# Patient Record
Sex: FEMALE | Race: WHITE | NOT HISPANIC OR LATINO
[De-identification: names, ages, dates, MRNs, and addresses within clinical notes are randomized per-mention and may not be internally consistent; named-entity substitution may affect disease eponyms.]

---

## 2017-01-20 ENCOUNTER — RECORD ABSTRACTING (OUTPATIENT)
Age: 47
End: 2017-01-20

## 2017-01-20 DIAGNOSIS — Z87.828 PERSONAL HISTORY OF OTHER (HEALED) PHYSICAL INJURY AND TRAUMA: ICD-10-CM

## 2017-01-20 DIAGNOSIS — Z78.9 OTHER SPECIFIED HEALTH STATUS: ICD-10-CM

## 2017-01-20 DIAGNOSIS — Z86.79 PERSONAL HISTORY OF OTHER DISEASES OF THE CIRCULATORY SYSTEM: ICD-10-CM

## 2017-01-20 RX ORDER — MULTIVITAMIN
TABLET ORAL
Refills: 0 | Status: ACTIVE | COMMUNITY

## 2017-01-20 RX ORDER — PHENOL 1.4 %
AEROSOL, SPRAY (ML) MUCOUS MEMBRANE
Refills: 0 | Status: ACTIVE | COMMUNITY

## 2017-02-14 ENCOUNTER — APPOINTMENT (OUTPATIENT)
Dept: DERMATOLOGY | Facility: CLINIC | Age: 47
End: 2017-02-14

## 2017-02-14 DIAGNOSIS — L70.9 ACNE, UNSPECIFIED: ICD-10-CM

## 2017-02-14 RX ORDER — TALC AND ZINC OXIDE 57.51; 10.65 G/71G; G/71G
81-15 POWDER TOPICAL
Qty: 1 | Refills: 6 | Status: ACTIVE | COMMUNITY
Start: 1900-01-01 | End: 1900-01-01

## 2017-05-30 ENCOUNTER — TRANSCRIPTION ENCOUNTER (OUTPATIENT)
Age: 47
End: 2017-05-30

## 2017-08-12 ENCOUNTER — EMERGENCY (EMERGENCY)
Facility: HOSPITAL | Age: 47
LOS: 0 days | Discharge: HOME | End: 2017-08-12
Attending: FAMILY MEDICINE | Admitting: FAMILY MEDICINE
Payer: MEDICAID

## 2017-08-12 VITALS
DIASTOLIC BLOOD PRESSURE: 65 MMHG | OXYGEN SATURATION: 100 % | HEART RATE: 72 BPM | SYSTOLIC BLOOD PRESSURE: 112 MMHG | RESPIRATION RATE: 18 BRPM

## 2017-08-12 VITALS
HEART RATE: 65 BPM | HEIGHT: 60 IN | OXYGEN SATURATION: 98 % | WEIGHT: 160.06 LBS | TEMPERATURE: 98 F | SYSTOLIC BLOOD PRESSURE: 119 MMHG | RESPIRATION RATE: 16 BRPM | DIASTOLIC BLOOD PRESSURE: 80 MMHG

## 2017-08-12 DIAGNOSIS — Q90.9 DOWN SYNDROME, UNSPECIFIED: ICD-10-CM

## 2017-08-12 DIAGNOSIS — Z95.1 PRESENCE OF AORTOCORONARY BYPASS GRAFT: Chronic | ICD-10-CM

## 2017-08-12 DIAGNOSIS — M25.462 EFFUSION, LEFT KNEE: ICD-10-CM

## 2017-08-12 DIAGNOSIS — Z90.49 ACQUIRED ABSENCE OF OTHER SPECIFIED PARTS OF DIGESTIVE TRACT: Chronic | ICD-10-CM

## 2017-08-12 PROCEDURE — 93971 EXTREMITY STUDY: CPT | Mod: 26,LT

## 2017-08-12 PROCEDURE — 99284 EMERGENCY DEPT VISIT MOD MDM: CPT | Mod: 25

## 2017-08-12 PROCEDURE — 73562 X-RAY EXAM OF KNEE 3: CPT | Mod: 26,LT

## 2017-08-12 PROCEDURE — 76377 3D RENDER W/INTRP POSTPROCES: CPT | Mod: 26

## 2017-08-12 PROCEDURE — 73700 CT LOWER EXTREMITY W/O DYE: CPT | Mod: 26,LT

## 2017-08-12 RX ORDER — IBUPROFEN 200 MG
600 TABLET ORAL ONCE
Qty: 0 | Refills: 0 | Status: COMPLETED | OUTPATIENT
Start: 2017-08-12 | End: 2017-08-12

## 2017-08-12 RX ADMIN — Medication 600 MILLIGRAM(S): at 06:08

## 2017-08-12 NOTE — CONSULT NOTE ADULT - SUBJECTIVE AND OBJECTIVE BOX
47y Female community ambulatory presents c/o L knee pain for the last onset last night with no known trauma. Patient is a able to answer questions, history somewhat unreliable, with aid at bedside. Denies numbness/tingling. Denies fever/chills. Denies pain/injury elsewhere.     PAST MEDICAL & SURGICAL HISTORY:  Down syndrome  Pneumonia  S/P CABG (coronary artery bypass graft)  S/P cholecystectomy    MEDICATIONS  (STANDING):    Allergies    No Known Allergies    Intolerances      Vital Signs Last 24 Hrs  T(C): 37.6 (08-12-17 @ 07:11), Max: 37.6 (08-12-17 @ 07:11)  T(F): 99.6 (08-12-17 @ 07:11), Max: 99.6 (08-12-17 @ 07:11)  HR: 75 (08-12-17 @ 10:19) (65 - 75)  BP: 105/63 (08-12-17 @ 10:19) (105/63 - 119/80)  BP(mean): --  RR: 18 (08-12-17 @ 10:19) (16 - 18)  SpO2: 100% (08-12-17 @ 10:19) (98% - 100%)    Imaging: XR demonstates L Knee no acute fracture or dislocation noted    Physical Exam  Gen: NAD  Left LE: skin intact, No erythema. AROM limited to pain. Swelling noted. Positive mild Effusion. Able to SLR, Neg log roll, TTp on medial aspect of the knee, no ttp elsewhere, +EHL/FHL/TA/GS function, no calf TTP, DP/PT pulses intact, compartments soft. Calf soft, nontender. Ligamentous exam benign: Varus/Valgus/Lockman Negative. Examination of tenderness inconsistent.

## 2017-08-12 NOTE — ED ADULT NURSE REASSESSMENT NOTE - NS ED NURSE REASSESS COMMENT FT1
pt care taken over at 10:12. Pt resting in bed, no acute distress noted. Caretaker at bedside. Awaiting Ortho consult. WIll monitor closely

## 2017-08-12 NOTE — ED ADULT TRIAGE NOTE - CHIEF COMPLAINT QUOTE
left knee pain. Patient from group home. Aid denies injury. Patient has complained of this before. No visible signs of injury.

## 2017-08-12 NOTE — ED PROVIDER NOTE - MUSCULOSKELETAL, MLM
Spine appears normal, range of motion is not limited. +tenderness to left lateral knee, no deformity noted

## 2017-08-12 NOTE — ED PROVIDER NOTE - MEDICAL DECISION MAKING DETAILS
Pt with hx Down's Syndrome with acute pain in knee with no hx of injury. XRay, doppler. No signs of infection at this time. Pain meds.

## 2017-08-12 NOTE — ED ADULT NURSE NOTE - OBJECTIVE STATEMENT
Patient arrived from group home c/o left knee pain Patient arrived from group home c/o left knee pain. Previous episode about 2 months ago, given Motrin with positive relief. Patient denies hitting knee/trauma. Aid at bedside reports they have been "checking up on her every 30 minutes" since she has early stages of dementia. Patient teary at this moment. No edema, bruising noted.

## 2017-08-12 NOTE — CONSULT NOTE ADULT - ASSESSMENT
A/P: 47y Female with Left Knee Pain with no known trauma  FU CT of the knee to rule out occult fracture  Will continue to follow  Discussed with Dr. Knutson who is aware A/P: 47y Female with Left Knee Pain with no known trauma  Doppler Negative  FU CT of the knee to rule out occult fracture  Will continue to follow  Discussed with Dr. Knutson who is aware

## 2017-08-12 NOTE — ED PROVIDER NOTE - CONSTITUTIONAL, MLM
normal... Appears very uncomfortable, unable to perform full exam. Awake, alert, oriented to person, place, time/situation and in no apparent distress.

## 2017-08-12 NOTE — ED PROVIDER NOTE - OBJECTIVE STATEMENT
48 y/o female with PMHx of down syndrome, resident of a group home who awoke at 4:15am with complaint of left knee pain, no known injury. Pt has previous Hx of left leg pain for which she was evaluated at another hospital, dx unknown by group home representative. No fever. Pt is a poor historian. Hx is obtained from aide. 46 y/o female with PMHx of down syndrome, resident of a group home who awoke at 4:15am with complaint of left knee pain, no known injury. Pt has previous Hx of left leg pain for which she was evaluated at another hospital, dx unknown by group home representative. No fever. Pt is a poor historian. Hx is obtained from .

## 2017-08-12 NOTE — ED PROVIDER NOTE - PROGRESS NOTE DETAILS
spoke with orhtopedics for left knee effusion ct lower extremity negative for fracture, spoke to orthopedics d/c to home

## 2017-08-21 ENCOUNTER — OTHER (OUTPATIENT)
Age: 47
End: 2017-08-21

## 2017-08-25 ENCOUNTER — APPOINTMENT (OUTPATIENT)
Dept: ORTHOPEDIC SURGERY | Facility: CLINIC | Age: 47
End: 2017-08-25

## 2017-09-08 ENCOUNTER — APPOINTMENT (OUTPATIENT)
Dept: ORTHOPEDIC SURGERY | Facility: CLINIC | Age: 47
End: 2017-09-08

## 2018-02-13 ENCOUNTER — APPOINTMENT (OUTPATIENT)
Dept: DERMATOLOGY | Facility: CLINIC | Age: 48
End: 2018-02-13
Payer: MEDICAID

## 2018-02-13 PROCEDURE — 99213 OFFICE O/P EST LOW 20 MIN: CPT

## 2018-02-13 RX ORDER — AMOXICILLIN 875 MG/1
TABLET, FILM COATED ORAL
Refills: 0 | Status: DISCONTINUED | COMMUNITY
End: 2018-02-13

## 2018-03-05 ENCOUNTER — TRANSCRIPTION ENCOUNTER (OUTPATIENT)
Age: 48
End: 2018-03-05

## 2018-12-03 ENCOUNTER — NON-APPOINTMENT (OUTPATIENT)
Age: 48
End: 2018-12-03

## 2018-12-03 ENCOUNTER — APPOINTMENT (OUTPATIENT)
Dept: CARDIOLOGY | Facility: CLINIC | Age: 48
End: 2018-12-03
Payer: MEDICAID

## 2018-12-03 VITALS
HEIGHT: 58 IN | SYSTOLIC BLOOD PRESSURE: 90 MMHG | BODY MASS INDEX: 39.88 KG/M2 | DIASTOLIC BLOOD PRESSURE: 56 MMHG | HEART RATE: 66 BPM | WEIGHT: 190 LBS

## 2018-12-03 DIAGNOSIS — L30.4 ERYTHEMA INTERTRIGO: ICD-10-CM

## 2018-12-03 DIAGNOSIS — Z78.9 OTHER SPECIFIED HEALTH STATUS: ICD-10-CM

## 2018-12-03 PROCEDURE — 93000 ELECTROCARDIOGRAM COMPLETE: CPT

## 2018-12-03 PROCEDURE — 99244 OFF/OP CNSLTJ NEW/EST MOD 40: CPT

## 2018-12-03 RX ORDER — LORATADINE 10 MG/1
10 CAPSULE, LIQUID FILLED ORAL
Refills: 0 | Status: ACTIVE | COMMUNITY

## 2018-12-03 RX ORDER — DONEPEZIL HYDROCHLORIDE 10 MG/1
10 TABLET, FILM COATED ORAL DAILY
Refills: 0 | Status: ACTIVE | COMMUNITY

## 2018-12-03 RX ORDER — GUAIFEN/DEXTROMETHORPHAN/PPA
SYRUP ORAL
Refills: 0 | Status: ACTIVE | COMMUNITY

## 2018-12-03 RX ORDER — MIRTAZAPINE 30 MG/1
30 TABLET, FILM COATED ORAL
Refills: 0 | Status: ACTIVE | COMMUNITY

## 2018-12-03 RX ORDER — ASPIRIN ENTERIC COATED TABLETS 81 MG 81 MG/1
81 TABLET, DELAYED RELEASE ORAL
Refills: 0 | Status: ACTIVE | COMMUNITY

## 2019-01-13 NOTE — PHYSICAL EXAM
[General Appearance - Well Developed] : well developed [General Appearance - In No Acute Distress] : no acute distress [Normal Conjunctiva] : the conjunctiva exhibited no abnormalities [Normal Oral Mucosa] : normal oral mucosa [No Oral Pallor] : no oral pallor [No Oral Cyanosis] : no oral cyanosis [Heart Rate And Rhythm] : heart rate and rhythm were normal [Heart Sounds] : normal S1 and S2 [Murmurs] : no murmurs present [Edema] : no peripheral edema present [Respiration, Rhythm And Depth] : normal respiratory rhythm and effort [Exaggerated Use Of Accessory Muscles For Inspiration] : no accessory muscle use [Auscultation Breath Sounds / Voice Sounds] : lungs were clear to auscultation bilaterally [Abdomen Soft] : soft [Abdomen Tenderness] : non-tender [Nail Clubbing] : no clubbing of the fingernails [Cyanosis, Localized] : no localized cyanosis [Skin Color & Pigmentation] : normal skin color and pigmentation [Skin Turgor] : normal skin turgor [] : no rash [FreeTextEntry1] : She is awake, cooperative. Not oriented.

## 2019-01-13 NOTE — DISCUSSION/SUMMARY
[FreeTextEntry1] : - Requested records from Dr. Cooley office regarding patient's previous cardiovascular evaluation and tests.\par - She has borderline low blood pressures. She's asymptomatic. No postural symptoms or change in blood pressure. Increase oral intake of water.\par - Currently, no cardiac medications listed. Medications were not changed.- The patient has been recommended to have SBE prophylaxis. Indication? She has a history of VSD repair at young age. Subsequent echocardiogram showed septal hypokinesis, no significant cardiomegaly and acceptable pulmonary pressures at 34 mmHg and PFO\par \par I will follow this patient in future once I have Her Relevant records. Thank you for this consultation\par \par Addendum 1/13/19:\par Based on her last echo Dec 2017, there is no indication of SBE prophylaxis prior to her dental cleaning and routine dental procedure\par \par Sincerely,\par Vladislav Wilson MD, FACC, [facility] Facility[facility]INCESS

## 2019-01-13 NOTE — REASON FOR VISIT
[FreeTextEntry1] : Sendy is a 48-year-old female with Down syndrome, significant mental retardation, chronic asymptomatic borderline low blood pressures, Sleep apnea using CPAP machine, History of VSD repair, the presence of PFO on echocardiogram, chronic right bundle branch block\par \par She's currently not on any specific cardiac medications. The patient used to follow up with Dr. Cooley. She was accompanied by her aide. Her previous medical records are not currently available for review.\par \par Recent history of syncope or hospital admission. No recent history of CHF exacerbation, chest pain Or acute dyspnea

## 2019-02-13 ENCOUNTER — APPOINTMENT (OUTPATIENT)
Dept: DERMATOLOGY | Facility: CLINIC | Age: 49
End: 2019-02-13
Payer: MEDICAID

## 2019-02-13 VITALS — HEIGHT: 58 IN | WEIGHT: 190 LBS | BODY MASS INDEX: 39.88 KG/M2

## 2019-02-13 VITALS — WEIGHT: 190 LBS | BODY MASS INDEX: 39.88 KG/M2 | HEIGHT: 58 IN

## 2019-02-13 DIAGNOSIS — L21.9 SEBORRHEIC DERMATITIS, UNSPECIFIED: ICD-10-CM

## 2019-02-13 DIAGNOSIS — D22.5 MELANOCYTIC NEVI OF TRUNK: ICD-10-CM

## 2019-02-13 PROCEDURE — 99213 OFFICE O/P EST LOW 20 MIN: CPT

## 2019-02-13 RX ORDER — AMOXICILLIN 500 MG/1
500 CAPSULE ORAL
Refills: 0 | Status: DISCONTINUED | COMMUNITY
End: 2019-02-13

## 2019-02-13 RX ORDER — PYRITHIONE ZINC 0.25 %
2 SPRAY, NON-AEROSOL (ML) TOPICAL
Qty: 1 | Refills: 10 | Status: ACTIVE | COMMUNITY
Start: 2017-02-14 | End: 1900-01-01

## 2019-02-13 RX ORDER — TALC
POWDER (GRAM) TOPICAL
Qty: 1 | Refills: 10 | Status: ACTIVE | COMMUNITY
Start: 2018-02-13 | End: 1900-01-01

## 2019-02-13 NOTE — PHYSICAL EXAM
[Full Body Skin Exam Performed] : performed [FreeTextEntry3] : Skin examination performed of the face, neck, trunk, arms, legs; \par The patient is well, alert and oriented, pleasant and cooperative.\par Eyelids, conjunctivae, oral mucosa, digits and nails all normal.  \par No cervical adenopathy.\par \par Normal findings include:\par \par Multiple benign nevi were noted. \par Angiomas\par mild seb derm of face;\par clean, shallow 8mm erosion mid lower back at waistline\par \par No lesions were suspicious for malignancy. \par \par

## 2019-02-13 NOTE — HISTORY OF PRESENT ILLNESS
[de-identified] : Pt. presents for skin check;\par No itching, bleeding, growing, changing lesions noted; \par Seb derm, intertrigo well controlled with OTC txs;\par small wound on back, near pressure point from pants;

## 2019-02-13 NOTE — ASSESSMENT
[FreeTextEntry1] : Complete skin examination is negative for malignancy;\par Continue regular exams; \par Cont Noble Formula to face, powder to body; cetaphil to hands\par also:  advanced healing to pressure erosion on lower back/waistline area

## 2019-06-03 ENCOUNTER — APPOINTMENT (OUTPATIENT)
Dept: CARDIOLOGY | Facility: CLINIC | Age: 49
End: 2019-06-03
Payer: MEDICAID

## 2019-06-03 VITALS
OXYGEN SATURATION: 99 % | DIASTOLIC BLOOD PRESSURE: 72 MMHG | HEIGHT: 58 IN | HEART RATE: 60 BPM | BODY MASS INDEX: 39.88 KG/M2 | WEIGHT: 190 LBS | SYSTOLIC BLOOD PRESSURE: 120 MMHG

## 2019-06-03 DIAGNOSIS — Z86.79 PERSONAL HISTORY OF OTHER DISEASES OF THE CIRCULATORY SYSTEM: ICD-10-CM

## 2019-06-03 DIAGNOSIS — F70 MILD INTELLECTUAL DISABILITIES: ICD-10-CM

## 2019-06-03 PROCEDURE — 99214 OFFICE O/P EST MOD 30 MIN: CPT

## 2019-06-03 RX ORDER — CLONAZEPAM 0.25 MG/1
0.25 TABLET, ORALLY DISINTEGRATING ORAL
Refills: 0 | Status: ACTIVE | COMMUNITY

## 2019-06-03 RX ORDER — TALC AND ZINC OXIDE 57.51; 10.65 G/71G; G/71G
POWDER TOPICAL
Refills: 0 | Status: ACTIVE | COMMUNITY

## 2019-06-03 RX ORDER — CETYL ALC/STEARYL ALC/PG/SLS
CREAM (GRAM) TOPICAL
Refills: 0 | Status: ACTIVE | COMMUNITY

## 2019-06-10 NOTE — DISCUSSION/SUMMARY
[FreeTextEntry1] : - Echocardiogram and carotid ultrasound reviewed from 2017\par - She has borderline low blood pressures In the past, adequate today.. She's asymptomatic. No postural symptoms or change in blood pressure. Increase oral intake of water.\par - Currently, no cardiac medications listed. Medications were not changed. \par - The patient has been recommended to have SBE prophylaxis. Indication? She has a history of VSD repair at young age. Subsequent echocardiogram showed septal hypokinesis, no significant cardiomegaly and acceptable pulmonary pressures at 34 mmHg and PFO\par \par \par Based on her last echo Dec 2017, there is no indication of SBE prophylaxis prior to her dental cleaning and routine dental procedure\par \par Repeat echocgram in future\par \par Sincerely,\par Vladislav Wilson MD, FACC, [facility]

## 2019-06-10 NOTE — REASON FOR VISIT
[FreeTextEntry1] : Sendy is a 49-year-old female with Down syndrome, significant mental retardation, chronic asymptomatic borderline low blood pressures, Sleep apnea using CPAP machine, History of VSD repair, the presence of PFO on echocardiogram, chronic right bundle branch block\par \par She's currently not on any specific cardiac medications. She does take aspirin\par \par No recent history of syncope or hospital admission. No recent history of CHF exacerbation, chest pain Or acute dyspnea

## 2019-06-10 NOTE — PHYSICAL EXAM
[General Appearance - Well Developed] : well developed [General Appearance - In No Acute Distress] : no acute distress [Normal Conjunctiva] : the conjunctiva exhibited no abnormalities [Normal Oral Mucosa] : normal oral mucosa [No Oral Pallor] : no oral pallor [No Oral Cyanosis] : no oral cyanosis [Respiration, Rhythm And Depth] : normal respiratory rhythm and effort [Exaggerated Use Of Accessory Muscles For Inspiration] : no accessory muscle use [Auscultation Breath Sounds / Voice Sounds] : lungs were clear to auscultation bilaterally [Heart Rate And Rhythm] : heart rate and rhythm were normal [Heart Sounds] : normal S1 and S2 [Murmurs] : no murmurs present [Abdomen Soft] : soft [Abdomen Tenderness] : non-tender [Nail Clubbing] : no clubbing of the fingernails [Cyanosis, Localized] : no localized cyanosis [Skin Color & Pigmentation] : normal skin color and pigmentation [Skin Turgor] : normal skin turgor [] : no rash [FreeTextEntry1] : She walks slowly. Requires help.

## 2019-08-16 ENCOUNTER — TRANSCRIPTION ENCOUNTER (OUTPATIENT)
Age: 49
End: 2019-08-16

## 2019-09-16 ENCOUNTER — TRANSCRIPTION ENCOUNTER (OUTPATIENT)
Age: 49
End: 2019-09-16

## 2020-02-04 ENCOUNTER — TRANSCRIPTION ENCOUNTER (OUTPATIENT)
Age: 50
End: 2020-02-04

## 2020-02-26 ENCOUNTER — TRANSCRIPTION ENCOUNTER (OUTPATIENT)
Age: 50
End: 2020-02-26

## 2020-02-28 ENCOUNTER — APPOINTMENT (OUTPATIENT)
Dept: DERMATOLOGY | Facility: CLINIC | Age: 50
End: 2020-02-28

## 2020-05-08 ENCOUNTER — APPOINTMENT (OUTPATIENT)
Dept: CARDIOLOGY | Facility: CLINIC | Age: 50
End: 2020-05-08

## 2020-05-21 ENCOUNTER — TRANSCRIPTION ENCOUNTER (OUTPATIENT)
Age: 50
End: 2020-05-21

## 2020-06-12 ENCOUNTER — APPOINTMENT (OUTPATIENT)
Dept: CARDIOLOGY | Facility: CLINIC | Age: 50
End: 2020-06-12
Payer: MEDICAID

## 2020-06-12 PROCEDURE — 93306 TTE W/DOPPLER COMPLETE: CPT

## 2020-06-17 ENCOUNTER — APPOINTMENT (OUTPATIENT)
Dept: CARDIOLOGY | Facility: CLINIC | Age: 50
End: 2020-06-17
Payer: MEDICAID

## 2020-06-17 VITALS
WEIGHT: 165 LBS | SYSTOLIC BLOOD PRESSURE: 93 MMHG | BODY MASS INDEX: 34.63 KG/M2 | DIASTOLIC BLOOD PRESSURE: 74 MMHG | HEIGHT: 58 IN | HEART RATE: 70 BPM

## 2020-06-17 DIAGNOSIS — G47.30 SLEEP APNEA, UNSPECIFIED: ICD-10-CM

## 2020-06-17 DIAGNOSIS — Q21.1 ATRIAL SEPTAL DEFECT: ICD-10-CM

## 2020-06-17 DIAGNOSIS — Z87.74 PERSONAL HISTORY OF (CORRECTED) CONGENITAL MALFORMATIONS OF HEART AND CIRCULATORY SYSTEM: ICD-10-CM

## 2020-06-17 DIAGNOSIS — Q90.9 DOWN SYNDROME, UNSPECIFIED: ICD-10-CM

## 2020-06-17 PROCEDURE — 99213 OFFICE O/P EST LOW 20 MIN: CPT | Mod: 95

## 2020-06-17 RX ORDER — LEVOTHYROXINE SODIUM 0.05 MG/1
50 TABLET ORAL DAILY
Refills: 0 | Status: DISCONTINUED | COMMUNITY
End: 2020-06-17

## 2020-06-17 RX ORDER — LEVOTHYROXINE SODIUM 0.07 MG/1
75 TABLET ORAL DAILY
Refills: 0 | Status: ACTIVE | COMMUNITY
Start: 2020-06-17

## 2020-06-17 RX ORDER — MONTELUKAST SODIUM 10 MG/1
10 TABLET, FILM COATED ORAL
Refills: 0 | Status: DISCONTINUED | COMMUNITY
End: 2020-06-17

## 2020-06-17 RX ORDER — MULTIVIT-MIN/FOLIC/VIT K/LYCOP 400-300MCG
50 MCG TABLET ORAL
Refills: 0 | Status: ACTIVE | COMMUNITY

## 2020-06-17 NOTE — HISTORY OF PRESENT ILLNESS
[Other Location: e.g. School (Enter Location, City,State)___] : at [unfilled], at the time of the visit. [Medical Office: ([facility])___] : at the medical office located in  [Care Coordinator] : care coordinator [FreeTextEntry3] : Jessi [FreeTextEntry1] : Time of initiation of visit: 8:20 am

## 2020-06-17 NOTE — REASON FOR VISIT
[FreeTextEntry1] : Sendy is a 50-year-old female with Down syndrome, significant mental retardation, chronic asymptomatic borderline low blood pressures, Sleep apnea using CPAP machine, History of VSD repair, the presence of PFO on echocardiogram, chronic right bundle branch block\par \par She's currently not on any specific cardiac medications. She does take aspirin\par \par No recent history of syncope or hospital admission. No recent history of CHF exacerbation, chest pain Or acute dyspnea\par \par She had follow-up echocardiogram that was unchanged she has lost 25 pounds with dietary changes in the past 1.5  years

## 2020-06-17 NOTE — DISCUSSION/SUMMARY
[FreeTextEntry1] : - Echocardiogram and carotid ultrasound reviewed from 2017\par - She has borderline low blood pressures In the past. . She's asymptomatic. No postural symptoms or change in blood pressure. Increase oral intake of water.\par - Currently, no cardiac medications listed. Medications were not changed. \par - The patient has been recommended to have SBE prophylaxis. Indication? She has a history of VSD repair at young age. \par \par Recent echocardiogram in June 2020 shows normal LV function.  Normal RV size and function.  Normal PASP.  Small PFO.  All this is unchanged.  \par \par Based on her last echo Dec 2017, there is no indication of SBE prophylaxis prior to her dental cleaning and routine dental procedure\par \par Follow-up in 1 year\par \par Sincerely,\par Vladislav Wilson MD, FACC, [facility]